# Patient Record
Sex: FEMALE | Employment: FULL TIME | URBAN - METROPOLITAN AREA
[De-identification: names, ages, dates, MRNs, and addresses within clinical notes are randomized per-mention and may not be internally consistent; named-entity substitution may affect disease eponyms.]

---

## 2024-05-20 ENCOUNTER — APPOINTMENT (OUTPATIENT)
Dept: SURGERY | Facility: CLINIC | Age: 39
End: 2024-05-20
Payer: COMMERCIAL

## 2024-06-06 ENCOUNTER — OFFICE VISIT (OUTPATIENT)
Dept: CARDIOLOGY | Facility: CLINIC | Age: 39
End: 2024-06-06
Payer: COMMERCIAL

## 2024-06-06 VITALS
SYSTOLIC BLOOD PRESSURE: 123 MMHG | OXYGEN SATURATION: 100 % | WEIGHT: 230 LBS | HEART RATE: 83 BPM | DIASTOLIC BLOOD PRESSURE: 78 MMHG

## 2024-06-06 DIAGNOSIS — R07.9 CHEST PAIN, UNSPECIFIED TYPE: Primary | ICD-10-CM

## 2024-06-06 DIAGNOSIS — K21.9 GASTROESOPHAGEAL REFLUX DISEASE, UNSPECIFIED WHETHER ESOPHAGITIS PRESENT: ICD-10-CM

## 2024-06-06 DIAGNOSIS — Z86.19 HX OF CLOSTRIDIUM DIFFICILE INFECTION: ICD-10-CM

## 2024-06-06 DIAGNOSIS — R06.00 DYSPNEA, UNSPECIFIED TYPE: ICD-10-CM

## 2024-06-06 PROCEDURE — 1036F TOBACCO NON-USER: CPT | Performed by: STUDENT IN AN ORGANIZED HEALTH CARE EDUCATION/TRAINING PROGRAM

## 2024-06-06 PROCEDURE — 99204 OFFICE O/P NEW MOD 45 MIN: CPT | Performed by: STUDENT IN AN ORGANIZED HEALTH CARE EDUCATION/TRAINING PROGRAM

## 2024-06-06 RX ORDER — TIRZEPATIDE 10 MG/.5ML
INJECTION, SOLUTION SUBCUTANEOUS
COMMUNITY
Start: 2024-05-22

## 2024-06-06 ASSESSMENT — ENCOUNTER SYMPTOMS
NEAR-SYNCOPE: 0
PSYCHIATRIC NEGATIVE: 1
SHORTNESS OF BREATH: 1
MUSCULOSKELETAL NEGATIVE: 1
ORTHOPNEA: 0
NEUROLOGICAL NEGATIVE: 1
SYNCOPE: 0
ENDOCRINE NEGATIVE: 1
VOMITING: 1
ALLERGIC/IMMUNOLOGIC NEGATIVE: 1
HEMATOLOGIC/LYMPHATIC NEGATIVE: 1
PALPITATIONS: 0
DIARRHEA: 1
CONSTITUTIONAL NEGATIVE: 1
PND: 0
EYES NEGATIVE: 1

## 2024-06-06 NOTE — PATIENT INSTRUCTIONS
Your chest pains were likely related to stomach / esophagus irritation; agree with proceeding with endoscopy for further evaluation with your GI team. OK to proceed with endoscopy from a cardiac standpoint.     If your shortness of breath does not improve or if your chest pain return we would then consider a heart ultrasound (echocardiogram) and an exercise stress test.     Please call my nurse Alicia with any questions; her contact information is below.     Thank you for your visit today. Please contact our office (via [employer] or phone) with any additional questions.     [facility]    Alicia, RN/Clinic Nurse for:    Dr. Daisha Glover    [address], [address]  [place] [address]    Phone: [phone] Press Option 5 then Option 3 to speak with the Clinic Nurse (Alicai)    _____    To Reach:    Billing Questions -    [phone]  Scheduling / Rescheduling -  Option 1  Refills / Medication Requests -  Option 3  General Office /  -  Option 4  Results -     Option 6  Medical Records -    Option 7  Repeat Options -    Option 9

## 2024-06-06 NOTE — PROGRESS NOTES
Cardiology New Patient History and Physical    Reason for referral: atypical chest pain    HPI: Kiley Benavides is a 38 y.o.  female who presents today for atypical chest pain; dyspnea. Past medical history of C. Difficile (4/2024), hx left knee surgery    Kiley had diarrhea in April 2024 and was found to have C. Difficile (seen at [facility]) Patient also has been vomiting intermittently.  S/p course of PO antibiotics. More recently had chest pains and dyspnea. Was evaluated at [facility] (5/14/2024) > no acute ischemic ECG changes.  D-dimer was un-elevated. Chest XR showed no intrathoracic process.  Seen by GI who recommended outpatient endoscopy for further evaluation for possible esophagitis.  Also following with ID give recent C. Difficile.     Kiley presented to cardiology clinic on 6/6/2024.  No further chest pains > improved after prior GI cocktail.  New dyspnea since ~ April 2024.  No clear relation to activity.  Tolerating physical activity without significant limitations.  Still having intermittent diarrhea (daily).  Occasional nausea and vomiting. Scheduled to follow-up with ID next week.     Past Medical History:   - As above    Surgical History:   She has a past surgical history that includes Knee surgery (Left).    Family History:   - No family history of heart disease    Allergies:  Patient has no known allergies.     Social History:   - Non-smoker; no illicit drug use  - Employed: works for [employer]    Prior Cardiovascular Testing (personally reviewed):     ECG (5/14/2024)- normal sinus rhythm    Review of Systems:  Review of Systems   Constitutional: Negative.   HENT: Negative.     Eyes: Negative.    Cardiovascular:  Negative for chest pain, near-syncope, orthopnea, palpitations, paroxysmal nocturnal dyspnea and syncope.   Respiratory:  Positive for shortness of breath.    Endocrine: Negative.    Hematologic/Lymphatic: Negative.    Skin: Negative.    Musculoskeletal: Negative.     Gastrointestinal:  Positive for diarrhea and vomiting.   Genitourinary: Negative.    Neurological: Negative.    Psychiatric/Behavioral: Negative.     Allergic/Immunologic: Negative.        Objective     Outpatient Medications:    Current Outpatient Medications:     Zepbound 10 mg/0.5 mL injection, INJECT 10MG SUBCUTANEOUSLY ONCE A WEEK FOR 4 WEEKS, Disp: , Rfl:      Last Recorded Vitals  /78 (BP Location: Left arm, Patient Position: Sitting)   Pulse 83   Wt 104 kg (230 lb)   SpO2 100%     Physical Exam:  Physical Exam  Constitutional:       General: She is not in acute distress.  HENT:      Head: Normocephalic.      Mouth/Throat:      Mouth: Mucous membranes are moist.   Eyes:      Extraocular Movements: Extraocular movements intact.      Conjunctiva/sclera: Conjunctivae normal.   Neck:      Vascular: No carotid bruit or JVD.   Cardiovascular:      Rate and Rhythm: Normal rate and regular rhythm.      Pulses: Normal pulses.      Heart sounds: No murmur heard.  Pulmonary:      Effort: Pulmonary effort is normal. No respiratory distress.      Breath sounds: Normal breath sounds.   Abdominal:      General: Bowel sounds are normal. There is no distension.      Palpations: Abdomen is soft.   Musculoskeletal:         General: No swelling.   Skin:     General: Skin is warm and dry.   Neurological:      General: No focal deficit present.      Mental Status: She is alert.      Cranial Nerves: No cranial nerve deficit.      Motor: No weakness.   Psychiatric:         Mood and Affect: Mood normal.         Behavior: Behavior normal.         Lab Review:    - Reviewed    Assessment:   38 y.o.  female who presents today for atypical chest pain; dyspnea. Past medical history of C. Difficile (4/2024), hx left knee surgery.    Patient with atypical chest pain which has subsequently resolved.  Likely secondary to GI etiology) esophagitis/gastritis).  Agree with further evaluation with endoscopy as per GI.  Recommend  "proceeding with endoscopy without additional cardiovascular testing.  Regarding patient's dyspnea, recent D-dimer is negative; PE unlikely.  If symptoms do not improve would then consider transthoracic echocardiogram and exercise stress ECG.  Patient agreeable to a wait-and-see approach; if her symptoms do not improve we would then proceed with echocardiogram and exercise stress ECG as above.    Overall Plan:  1.  Atypical chest pain  - Likely secondary to GI etiology as above  - Follow-up as per GI; okay to proceed with endoscopy without additional cardiovascular testing    2.  History of C. difficile infection; intermittent diarrhea/nausea vomiting  - Follow-up with ID    3. Discussed \"red flag\" symptoms that should prompt immediate medical attention; patient verbalized understanding    Disposition: Return to cardiology clinic as needed    Adam Ashton MD        "